# Patient Record
Sex: FEMALE | Race: WHITE | NOT HISPANIC OR LATINO | URBAN - METROPOLITAN AREA
[De-identification: names, ages, dates, MRNs, and addresses within clinical notes are randomized per-mention and may not be internally consistent; named-entity substitution may affect disease eponyms.]

---

## 2017-05-23 ENCOUNTER — NEW REFERRAL (OUTPATIENT)
Dept: URBAN - METROPOLITAN AREA CLINIC 26 | Facility: CLINIC | Age: 67
End: 2017-05-23

## 2017-05-23 VITALS
DIASTOLIC BLOOD PRESSURE: 78 MMHG | WEIGHT: 150 LBS | BODY MASS INDEX: 26.91 KG/M2 | HEIGHT: 62.5 IN | SYSTOLIC BLOOD PRESSURE: 114 MMHG | HEART RATE: 68 BPM

## 2017-05-23 DIAGNOSIS — H43.393: ICD-10-CM

## 2017-05-23 DIAGNOSIS — H25.13: ICD-10-CM

## 2017-05-23 DIAGNOSIS — H04.123: ICD-10-CM

## 2017-05-23 DIAGNOSIS — D31.32: ICD-10-CM

## 2017-05-23 DIAGNOSIS — H02.836: ICD-10-CM

## 2017-05-23 DIAGNOSIS — E11.9: ICD-10-CM

## 2017-05-23 DIAGNOSIS — H02.833: ICD-10-CM

## 2017-05-23 PROCEDURE — 76512 OPH US DX B-SCAN: CPT

## 2017-05-23 PROCEDURE — 92004 COMPRE OPH EXAM NEW PT 1/>: CPT

## 2017-05-23 PROCEDURE — 92250 FUNDUS PHOTOGRAPHY W/I&R: CPT

## 2017-05-23 PROCEDURE — 92225 OPHTHALMOSCOPY (INITIAL): CPT

## 2017-05-23 ASSESSMENT — VISUAL ACUITY
OS_CC: 20/40+2
OD_CC: 20/30+2
OS_SC: 20/200
OS_PH: 20/40+1
OD_SC: 20/70
OD_PH: 20/40+1

## 2017-05-23 ASSESSMENT — TONOMETRY
OS_IOP_MMHG: 14
OD_IOP_MMHG: 14

## 2017-08-22 ENCOUNTER — FOLLOW UP (OUTPATIENT)
Dept: URBAN - METROPOLITAN AREA CLINIC 26 | Facility: CLINIC | Age: 67
End: 2017-08-22

## 2017-08-22 VITALS — DIASTOLIC BLOOD PRESSURE: 101 MMHG | SYSTOLIC BLOOD PRESSURE: 143 MMHG | HEIGHT: 55 IN | HEART RATE: 82 BPM

## 2017-08-22 DIAGNOSIS — E11.9: ICD-10-CM

## 2017-08-22 DIAGNOSIS — H43.393: ICD-10-CM

## 2017-08-22 DIAGNOSIS — D31.32: ICD-10-CM

## 2017-08-22 PROCEDURE — 76512 OPH US DX B-SCAN: CPT

## 2017-08-22 PROCEDURE — 92014 COMPRE OPH EXAM EST PT 1/>: CPT

## 2017-08-22 PROCEDURE — 92250 FUNDUS PHOTOGRAPHY W/I&R: CPT

## 2017-08-22 ASSESSMENT — TONOMETRY
OS_IOP_MMHG: 15
OD_IOP_MMHG: 13

## 2017-08-22 ASSESSMENT — VISUAL ACUITY
OD_SC: 20/25+2
OS_SC: 20/20-3

## 2018-02-26 ENCOUNTER — FOLLOW UP (OUTPATIENT)
Dept: URBAN - METROPOLITAN AREA CLINIC 26 | Facility: CLINIC | Age: 68
End: 2018-02-26

## 2018-02-26 VITALS
HEIGHT: 62 IN | WEIGHT: 158 LBS | HEART RATE: 88 BPM | SYSTOLIC BLOOD PRESSURE: 143 MMHG | BODY MASS INDEX: 29.08 KG/M2 | DIASTOLIC BLOOD PRESSURE: 97 MMHG

## 2018-02-26 DIAGNOSIS — H02.833: ICD-10-CM

## 2018-02-26 DIAGNOSIS — H04.123: ICD-10-CM

## 2018-02-26 DIAGNOSIS — E11.9: ICD-10-CM

## 2018-02-26 DIAGNOSIS — H02.836: ICD-10-CM

## 2018-02-26 DIAGNOSIS — D31.32: ICD-10-CM

## 2018-02-26 DIAGNOSIS — H43.393: ICD-10-CM

## 2018-02-26 PROCEDURE — 92250 FUNDUS PHOTOGRAPHY W/I&R: CPT

## 2018-02-26 PROCEDURE — 76512 OPH US DX B-SCAN: CPT

## 2018-02-26 PROCEDURE — 92014 COMPRE OPH EXAM EST PT 1/>: CPT

## 2018-02-26 ASSESSMENT — TONOMETRY
OS_IOP_MMHG: 16
OD_IOP_MMHG: 14

## 2018-02-26 ASSESSMENT — VISUAL ACUITY
OD_SC: 20/25-
OS_SC: 20/20-

## 2021-06-07 NOTE — PATIENT DISCUSSION
CHALAZION OD: PRESCRIBED WARM COMPRESSES, EYELID SCRUBS AND DOXYCYCLINE 100MG BID PO X 2 WEEKS AND TOBRADEX RADHA QHS X 2 WEEKS.

## 2021-06-07 NOTE — PATIENT DISCUSSION
New Prescription: TobraDex (tobramycin-dexamethasone): ointment: 0.3-0.1% a small amount at bedtime into right eye 06-

## 2021-08-13 NOTE — PATIENT DISCUSSION
New Prescription: doxycycline hyclate (doxycycline hyclate): tablet: 100 mg 1 tablet twice a day by mouth 08-

## 2021-08-13 NOTE — PATIENT DISCUSSION
CHALAZION OD: PRESCRIBED WARM COMPRESSES, EYELID SCRUBS AND DOXYCYCLINE 100MG BID PO X 2 WEEKS,THEN QHS X1 WEEK. TOBRADEX RADHA QHS X 3 WEEKS.

## 2021-08-13 NOTE — PATIENT DISCUSSION
Chalazion Counseling: I explained to the patient that a chalazion is an inflammatory reaction to trapped oil secretions in the eyelid. I also explained that while chalazions usually respond well to treatment, some people are prone to recurrences of them. The patient was instructed on the use of warm compresses on the chalazion for five to ten minutes, three to four times per day. Return for follow-up as scheduled or sooner if symptoms worsen. normal...

## 2021-08-13 NOTE — PATIENT DISCUSSION
New Prescription: TobraDex (tobramycin-dexamethasone): ointment: 0.3-0.1% a small amount at bedtime into right eye 08-